# Patient Record
Sex: FEMALE | Race: WHITE | Employment: FULL TIME | URBAN - METROPOLITAN AREA
[De-identification: names, ages, dates, MRNs, and addresses within clinical notes are randomized per-mention and may not be internally consistent; named-entity substitution may affect disease eponyms.]

---

## 2020-10-08 ENCOUNTER — TRANSCRIBE ORDER (OUTPATIENT)
Dept: SCHEDULING | Age: 30
End: 2020-10-08

## 2020-10-08 DIAGNOSIS — N63.0 LUMP OR MASS IN BREAST: Primary | ICD-10-CM

## 2020-10-21 ENCOUNTER — NURSE NAVIGATOR (OUTPATIENT)
Dept: MAMMOGRAPHY | Age: 30
End: 2020-10-21

## 2020-10-21 ENCOUNTER — HOSPITAL ENCOUNTER (OUTPATIENT)
Dept: MAMMOGRAPHY | Age: 30
Discharge: HOME | End: 2020-10-21
Attending: ADVANCED PRACTICE MIDWIFE
Payer: COMMERCIAL

## 2020-10-21 ENCOUNTER — HOSPITAL ENCOUNTER (OUTPATIENT)
Dept: ULTRASOUND IMAGING | Age: 30
Discharge: HOME | End: 2020-10-21
Attending: ADVANCED PRACTICE MIDWIFE
Payer: COMMERCIAL

## 2020-10-21 DIAGNOSIS — N63.0 LUMP OR MASS IN BREAST: ICD-10-CM

## 2020-10-21 PROCEDURE — 76642 ULTRASOUND BREAST LIMITED: CPT

## 2020-10-21 PROCEDURE — 77062 BREAST TOMOSYNTHESIS BI: CPT

## 2020-10-21 NOTE — PROGRESS NOTES
Met with Ms. Barbara Jose regarding recommendation for Right Breast Ultrasound Biopsy. Explained procedure and answered all questions. Patient requested to have biopsy scheduled at the breast center with Radiologist in lieu of referral to breast surgeon. In addition, Ms. Barbara Jose requested that we obtain her prior breast imaging from [place] (2013) for comparison prior to performing biopsy. This is a reasonable request and Radiologist will issue addendum once priors are received. Patient aware that even with prior imaging, it may not change her biopsy recommendation. Will contact patient and referring provider once addendum is complete. ULTRASOUND GUIDED BREAST BIOPSY   PRE-PROCEDURE PATIENT INSTRUCTIONS      · No fasting is required, eating a light meal is recommended. · Wear a loose-fitting outfit, patient will undress from the waist up and change into a gown for the procedure. · Bring or wear a tight fitting bra to wear after the procedure to support the breast.   · Deodorant is allowed. · Patient will be able to drive home after the procedure. · Patient should take all prescribed medications EXPECT FOR BLOOD THINNERS. Each blood-thinning medication is different; the breast navigator will inform the patient of specific instructions related to their prescriptions.

## 2020-11-27 ENCOUNTER — HOSPITAL ENCOUNTER (OUTPATIENT)
Dept: ULTRASOUND IMAGING | Age: 30
Discharge: HOME | End: 2020-11-27
Attending: ADVANCED PRACTICE MIDWIFE
Payer: COMMERCIAL

## 2020-11-27 DIAGNOSIS — N63.0 LUMP OR MASS IN BREAST: ICD-10-CM

## 2020-11-27 DIAGNOSIS — R92.8 ABNORMAL MAMMOGRAM: ICD-10-CM

## 2020-11-27 PROCEDURE — 76642 ULTRASOUND BREAST LIMITED: CPT

## 2021-01-26 ENCOUNTER — TRANSCRIBE ORDER (OUTPATIENT)
Dept: SCHEDULING | Age: 31
End: 2021-01-26

## 2021-01-26 DIAGNOSIS — N63.10 BREAST MASS, RIGHT: Primary | ICD-10-CM

## 2021-03-18 ENCOUNTER — HOSPITAL ENCOUNTER (OUTPATIENT)
Dept: ULTRASOUND IMAGING | Age: 31
Discharge: HOME | End: 2021-03-18
Attending: ADVANCED PRACTICE MIDWIFE
Payer: COMMERCIAL

## 2021-03-18 DIAGNOSIS — N63.10 BREAST MASS, RIGHT: ICD-10-CM

## 2021-03-18 PROCEDURE — 76642 ULTRASOUND BREAST LIMITED: CPT

## 2021-04-05 ENCOUNTER — HOSPITAL ENCOUNTER (OUTPATIENT)
Dept: ULTRASOUND IMAGING | Age: 31
Discharge: HOME | End: 2021-04-05
Attending: ADVANCED PRACTICE MIDWIFE
Payer: COMMERCIAL

## 2021-04-05 DIAGNOSIS — N63.0 LUMP OR MASS IN BREAST: ICD-10-CM

## 2021-04-05 DIAGNOSIS — R92.8 ABNORMAL MAMMOGRAM: ICD-10-CM

## 2021-04-05 PROCEDURE — 74011000250 HC RX REV CODE- 250

## 2021-04-05 PROCEDURE — 88305 TISSUE EXAM BY PATHOLOGIST: CPT

## 2021-04-05 PROCEDURE — 77030020003 US BX BREAST VAC RT 1ST LESION W/CLIP AND SPECIMEN

## 2021-04-05 RX ORDER — LIDOCAINE HYDROCHLORIDE AND EPINEPHRINE 10; 10 MG/ML; UG/ML
1.5 INJECTION, SOLUTION INFILTRATION; PERINEURAL
Status: COMPLETED | OUTPATIENT
Start: 2021-04-05 | End: 2021-04-05

## 2021-04-05 RX ORDER — LIDOCAINE HYDROCHLORIDE 10 MG/ML
5 INJECTION, SOLUTION EPIDURAL; INFILTRATION; INTRACAUDAL; PERINEURAL
Status: COMPLETED | OUTPATIENT
Start: 2021-04-05 | End: 2021-04-05

## 2021-04-05 RX ADMIN — LIDOCAINE HYDROCHLORIDE AND EPINEPHRINE 15 MG: 10; 10 INJECTION, SOLUTION INFILTRATION; PERINEURAL at 13:50

## 2021-04-05 RX ADMIN — LIDOCAINE HYDROCHLORIDE 5 ML: 10 INJECTION, SOLUTION EPIDURAL; INFILTRATION; INTRACAUDAL; PERINEURAL at 13:49

## 2025-01-20 ENCOUNTER — HOSPITAL ENCOUNTER (OUTPATIENT)
Facility: HOSPITAL | Age: 35
Discharge: HOME | End: 2025-01-23
Payer: COMMERCIAL

## 2025-01-20 VITALS — WEIGHT: 175 LBS | BODY MASS INDEX: 29.88 KG/M2 | HEIGHT: 64 IN

## 2025-01-20 DIAGNOSIS — Z12.31 VISIT FOR SCREENING MAMMOGRAM: ICD-10-CM

## 2025-01-20 PROCEDURE — 77067 SCR MAMMO BI INCL CAD: CPT

## 2025-01-28 ENCOUNTER — TRANSCRIBE ORDERS (OUTPATIENT)
Facility: HOSPITAL | Age: 35
End: 2025-01-28

## 2025-01-28 DIAGNOSIS — R92.8 ABNORMAL MAMMOGRAM: Primary | ICD-10-CM

## 2025-03-10 ENCOUNTER — HOSPITAL ENCOUNTER (OUTPATIENT)
Facility: HOSPITAL | Age: 35
Discharge: HOME | End: 2025-03-13
Payer: COMMERCIAL

## 2025-03-10 DIAGNOSIS — R92.8 ABNORMAL MAMMOGRAM: ICD-10-CM

## 2025-03-10 PROCEDURE — 76642 ULTRASOUND BREAST LIMITED: CPT

## 2025-03-10 PROCEDURE — 77065 DX MAMMO INCL CAD UNI: CPT
